# Patient Record
Sex: FEMALE
[De-identification: names, ages, dates, MRNs, and addresses within clinical notes are randomized per-mention and may not be internally consistent; named-entity substitution may affect disease eponyms.]

---

## 2020-02-24 ENCOUNTER — APPOINTMENT (OUTPATIENT)
Dept: MAMMOGRAPHY | Facility: CLINIC | Age: 50
End: 2020-02-24
Payer: COMMERCIAL

## 2020-02-24 PROCEDURE — 77063 BREAST TOMOSYNTHESIS BI: CPT

## 2020-02-24 PROCEDURE — 77067 SCR MAMMO BI INCL CAD: CPT

## 2020-03-20 ENCOUNTER — APPOINTMENT (OUTPATIENT)
Dept: ULTRASOUND IMAGING | Facility: CLINIC | Age: 50
End: 2020-03-20

## 2020-03-20 ENCOUNTER — APPOINTMENT (OUTPATIENT)
Dept: MAMMOGRAPHY | Facility: CLINIC | Age: 50
End: 2020-03-20
Payer: COMMERCIAL

## 2020-03-20 PROCEDURE — 76642 ULTRASOUND BREAST LIMITED: CPT | Mod: LT

## 2020-03-20 PROCEDURE — G0279: CPT | Mod: LT

## 2020-03-20 PROCEDURE — 77065 DX MAMMO INCL CAD UNI: CPT | Mod: LT

## 2020-03-25 PROBLEM — Z00.00 ENCOUNTER FOR PREVENTIVE HEALTH EXAMINATION: Status: ACTIVE | Noted: 2020-03-25

## 2020-06-18 ENCOUNTER — APPOINTMENT (OUTPATIENT)
Dept: BREAST CENTER | Facility: CLINIC | Age: 50
End: 2020-06-18
Payer: COMMERCIAL

## 2020-06-18 VITALS
BODY MASS INDEX: 36.1 KG/M2 | HEIGHT: 67 IN | HEART RATE: 109 BPM | WEIGHT: 230 LBS | TEMPERATURE: 98.7 F | DIASTOLIC BLOOD PRESSURE: 90 MMHG | SYSTOLIC BLOOD PRESSURE: 148 MMHG

## 2020-06-18 DIAGNOSIS — Z63.5 DISRUPTION OF FAMILY BY SEPARATION AND DIVORCE: ICD-10-CM

## 2020-06-18 DIAGNOSIS — R92.2 INCONCLUSIVE MAMMOGRAM: ICD-10-CM

## 2020-06-18 DIAGNOSIS — Z80.1 FAMILY HISTORY OF MALIGNANT NEOPLASM OF TRACHEA, BRONCHUS AND LUNG: ICD-10-CM

## 2020-06-18 DIAGNOSIS — R92.8 OTHER ABNORMAL AND INCONCLUSIVE FINDINGS ON DIAGNOSTIC IMAGING OF BREAST: ICD-10-CM

## 2020-06-18 DIAGNOSIS — Z78.9 OTHER SPECIFIED HEALTH STATUS: ICD-10-CM

## 2020-06-18 DIAGNOSIS — Z72.89 OTHER PROBLEMS RELATED TO LIFESTYLE: ICD-10-CM

## 2020-06-18 DIAGNOSIS — F19.90 OTHER PSYCHOACTIVE SUBSTANCE USE, UNSPECIFIED, UNCOMPLICATED: ICD-10-CM

## 2020-06-18 DIAGNOSIS — R93.89 ABNORMAL FINDINGS ON DIAGNOSTIC IMAGING OF OTHER SPECIFIED BODY STRUCTURES: ICD-10-CM

## 2020-06-18 PROCEDURE — 99243 OFF/OP CNSLTJ NEW/EST LOW 30: CPT

## 2020-06-18 RX ORDER — IRBESARTAN 300 MG/1
300 TABLET, FILM COATED ORAL
Refills: 0 | Status: ACTIVE | COMMUNITY

## 2020-06-18 RX ORDER — SERTRALINE HYDROCHLORIDE 100 MG/1
100 TABLET, FILM COATED ORAL
Refills: 0 | Status: ACTIVE | COMMUNITY

## 2020-06-18 RX ORDER — LEVOTHYROXINE SODIUM 137 UG/1
137 TABLET ORAL
Refills: 0 | Status: ACTIVE | COMMUNITY

## 2020-06-18 RX ORDER — CLONAZEPAM 0.5 MG/1
0.5 TABLET ORAL
Refills: 0 | Status: ACTIVE | COMMUNITY

## 2020-06-18 SDOH — SOCIAL STABILITY - SOCIAL INSECURITY: DISRUPTION OF FAMILY BY SEPARATION AND DIVORCE: Z63.5

## 2020-06-25 ENCOUNTER — APPOINTMENT (OUTPATIENT)
Dept: ULTRASOUND IMAGING | Facility: CLINIC | Age: 50
End: 2020-06-25
Payer: COMMERCIAL

## 2020-06-25 ENCOUNTER — RESULT REVIEW (OUTPATIENT)
Age: 50
End: 2020-06-25

## 2020-06-25 DIAGNOSIS — D24.2 BENIGN NEOPLASM OF LEFT BREAST: ICD-10-CM

## 2020-06-25 PROCEDURE — 76641 ULTRASOUND BREAST COMPLETE: CPT | Mod: RT

## 2020-06-25 PROCEDURE — 19083 BX BREAST 1ST LESION US IMAG: CPT | Mod: LT

## 2020-06-25 PROCEDURE — A4648A: CUSTOM | Mod: 76

## 2020-06-25 PROCEDURE — 77065 DX MAMMO INCL CAD UNI: CPT | Mod: LT

## 2020-06-25 PROCEDURE — 19084Z: CUSTOM

## 2020-07-02 NOTE — ASSESSMENT
[FreeTextEntry1] : Pt is a 49 y/o female, here for initial consultation, referred by Dr. Junior for abnormal mammogram and breast US. Denies any breast lesions, discharge or masses. This was pt's first screening mmg.\par FHx sig for mUncle lung cancer 62. Denies FHx breast of other cancers.\par \par 2/24/20 NFR Von SmmgT: baseline, L two regions of asymmetry, UOQ (8mm) ovoid density, 6:00 nodular asymmetry; R no sig findings.BR0 R Dmmg/US rec.\par \par 3/20/20 NFR L DmmgT/US: dense, *suspicious nodules to UOQ (8mm) round and 6:00 (9mm) with lobulated boarders.\par 3/20/20 NFR L D US: baseline:\par    *1:00 N1 (1.1x0.7x1.0cm) lobulated hypoechoic solid nodule, US Bx rec\par     *2:00 N1 (1.1x0.7x1.0cm) lobulated hypoechoic solid nodule w internal septations,US Bx rec\par     6:00 N1 ([id number]) ovoid circumscribed hypoechoic nodule,\par     6:00 N1 ([id number]) ovoid circumscribed hypoechoic nodule. BR4A Us Bx rec for 1:00 and 2:00 nodules.\par CBE: [facility]. None of the masses on u/s are palpable.\par Reviewed mmg/US with recommendation for US Bx X2. Reviewed procedure, pt agrees to schedule. Pt very anxious and rec is to take her klonopin prior to the bx. Also discussed further treatment f.u pending results of bx. Pt to f.u 1 week after her biopsy. \par No ASA or NSAIDs 1 week prior.

## 2020-07-02 NOTE — REVIEW OF SYSTEMS
[Negative] : Endocrine [FreeTextEntry2] : work related stress weight loss [Chest Pain] : no chest pain [FreeTextEntry7] : stress from work [de-identified] : anxiety

## 2020-07-02 NOTE — HISTORY OF PRESENT ILLNESS
[FreeTextEntry1] : Pt is a 51 y/o white female, here for initial consultation, referred by Dr. Junior for abnormal mammogram and breast US. Denies any breast lesions, discharge or masses. This was pt's first screening mmg.\par FHx sig for mUncle lung cancer 62. Denies FHx breast of other cancers.\par \par 2/24/20 NFR Von SmmgT: baseline, L two regions of asymmetry, UOQ (8mm) ovoid density, 6:00 nodular asymmetry; R no sig findings.BR0 R Dmmg/US rec.\par \par 3/20/20 NFR L DmmgT/US: dense, *suspicious nodules to UOQ (8mm) round and 6:00 (9mm) with lobulated boarders.\par 3/20/20 NFR L D US: baseline:\par    *1:00 N1 (1.1x0.7x1.0cm) lobulated hypoechoic solid nodule, US Bx rec\par     *2:00 N1 (1.1x0.7x1.0cm) lobulated hypoechoic solid nodule w internal septations,US Bx rec\par     6:00 N1 ([id number]) ovoid circumscribed hypoechoic nodule,\par     6:00 N1 ([id number]) ovoid circumscribed hypoechoic nodule. BR4A Us Bx rec for 1:00 and 2:00             nodules.

## 2020-07-02 NOTE — DATA REVIEWED
[FreeTextEntry1] : 2/24/20 NFR Von SmmgT: baseline, L two regions of asymmetry, UOQ (8mm) ovoid density, 6:00 nodular asymmetry; R no sig findings.BR0 L Dmmg/US rec.\par \par 3/20/20 NFR L DmmgT/US: dense, *suspicious nodules to UOQ (8mm) round and 6:00 (9mm) with lobulated boarders.\par 3/20/20 NFR L D US: baseline:\par    *1:00 N1 (1.1x0.7x1.0cm) lobulated hypoechoic solid nodule, US Bx rec\par     *2:00 N1 (1.1x0.7x1.0cm) lobulated hypoechoic solid nodule w internal septations,US Bx rec\par     6:00 N1 ([id number]) ovoid circumscribed hypoechoic nodule,\par     6:00 N1 ([id number]) ovoid circumscribed hypoechoic nodule. BR4A Us Bx rec for 1:00 and 2:00             nodules.

## 2020-07-02 NOTE — PAST MEDICAL HISTORY
[Total Preg ___] : G[unfilled] [Menarche Age ____] : age at menarche was [unfilled] [Menopause Age____] : age at menopause was [unfilled] [Living ___] : Living: [unfilled] [Live Births ___] : P[unfilled]  [History of Hormone Replacement Treatment] : has no history of hormone replacement treatment [FreeTextEntry8] : no [FreeTextEntry6] : no [FreeTextEntry7] : yes 8-10 years [FreeTextEntry5] : L oophorectomy

## 2020-07-02 NOTE — ADDENDUM
[FreeTextEntry1] : 6/25/20 NW R US: baseline, (10mm) benign hypoechoic nodule noted similar to multiple nodules on L. No susp findings BR3 US rec 6 mo unless L US Bx document suspicious findings.\par 6/25/20 Dustin Left  US Bx:FA. Concordant. Rec mmg/US 6 mo.

## 2020-07-02 NOTE — PHYSICAL EXAM
[Bra Size: ___] : Bra Size: [unfilled] [Normocephalic] : normocephalic [Atraumatic] : atraumatic [Supple] : supple [No Thyromegaly] : no thyromegaly [No Supraclavicular Adenopathy] : no supraclavicular adenopathy [Examined in the supine and seated position] : examined in the supine and seated position [No dominant masses] : no dominant masses in right breast  [No dominant masses] : no dominant masses left breast [No Nipple Retraction] : no left nipple retraction [No Nipple Discharge] : no left nipple discharge [No Axillary Lymphadenopathy] : no left axillary lymphadenopathy [No Edema] : no edema [No Ulceration] : no ulceration [No Rashes] : no rashes [No Swelling] : no swelling [Full ROM] : full range of motion [Breast Nipple Flattening] : nipples not flattened [Breast Nipple Retraction] : nipples not retracted [Breast Nipple Inversion] : nipples not inverted [Breast Abnormal Secretion Serous Fluid] : no serous discharge [Breast Nipple Fissures] : nipples not fissured [Breast Abnormal Secretion Bloody Fluid] : no bloody discharge [Breast Abnormal Lactation (Galactorrhea)] : no galactorrhea [Breast Abnormal Secretion Opalescent Fluid] : no milky discharge

## 2020-07-09 PROBLEM — D24.2 FIBROADENOMA OF LEFT BREAST: Status: ACTIVE | Noted: 2020-07-09

## 2021-11-29 ENCOUNTER — APPOINTMENT (OUTPATIENT)
Dept: ULTRASOUND IMAGING | Facility: CLINIC | Age: 51
End: 2021-11-29
Payer: COMMERCIAL

## 2021-11-29 ENCOUNTER — APPOINTMENT (OUTPATIENT)
Dept: MAMMOGRAPHY | Facility: CLINIC | Age: 51
End: 2021-11-29

## 2021-11-29 PROCEDURE — 77066 DX MAMMO INCL CAD BI: CPT

## 2021-11-29 PROCEDURE — 76642 ULTRASOUND BREAST LIMITED: CPT | Mod: LT

## 2021-11-29 PROCEDURE — G0279: CPT

## 2023-02-24 ENCOUNTER — APPOINTMENT (OUTPATIENT)
Dept: MAMMOGRAPHY | Facility: CLINIC | Age: 53
End: 2023-02-24
Payer: COMMERCIAL

## 2023-02-24 ENCOUNTER — APPOINTMENT (OUTPATIENT)
Dept: ULTRASOUND IMAGING | Facility: CLINIC | Age: 53
End: 2023-02-24
Payer: COMMERCIAL

## 2023-02-24 PROCEDURE — 76641 ULTRASOUND BREAST COMPLETE: CPT | Mod: 50

## 2023-02-24 PROCEDURE — 77067 SCR MAMMO BI INCL CAD: CPT

## 2023-02-24 PROCEDURE — 77063 BREAST TOMOSYNTHESIS BI: CPT

## 2023-10-26 VITALS — WEIGHT: 230 LBS | HEIGHT: 67 IN | BODY MASS INDEX: 36.1 KG/M2

## 2023-10-26 DIAGNOSIS — F17.200 NICOTINE DEPENDENCE, UNSPECIFIED, UNCOMPLICATED: ICD-10-CM

## 2023-11-07 ENCOUNTER — APPOINTMENT (OUTPATIENT)
Dept: CT IMAGING | Facility: CLINIC | Age: 53
End: 2023-11-07
Payer: COMMERCIAL

## 2023-11-07 PROCEDURE — 71271 CT THORAX LUNG CANCER SCR C-: CPT

## 2024-03-28 ENCOUNTER — APPOINTMENT (OUTPATIENT)
Dept: ULTRASOUND IMAGING | Facility: CLINIC | Age: 54
End: 2024-03-28
Payer: COMMERCIAL

## 2024-03-28 ENCOUNTER — APPOINTMENT (OUTPATIENT)
Dept: MAMMOGRAPHY | Facility: CLINIC | Age: 54
End: 2024-03-28
Payer: COMMERCIAL

## 2024-03-28 PROCEDURE — 76641 ULTRASOUND BREAST COMPLETE: CPT | Mod: 50

## 2024-03-28 PROCEDURE — 77067 SCR MAMMO BI INCL CAD: CPT

## 2024-03-28 PROCEDURE — 77063 BREAST TOMOSYNTHESIS BI: CPT

## 2024-11-07 ENCOUNTER — NON-APPOINTMENT (OUTPATIENT)
Age: 54
End: 2024-11-07

## 2024-11-07 VITALS — WEIGHT: 230 LBS | HEIGHT: 67 IN | BODY MASS INDEX: 36.1 KG/M2

## 2024-11-07 DIAGNOSIS — F17.200 NICOTINE DEPENDENCE, UNSPECIFIED, UNCOMPLICATED: ICD-10-CM

## 2024-11-27 ENCOUNTER — APPOINTMENT (OUTPATIENT)
Dept: CT IMAGING | Facility: CLINIC | Age: 54
End: 2024-11-27
Payer: COMMERCIAL

## 2024-11-27 ENCOUNTER — APPOINTMENT (OUTPATIENT)
Dept: ULTRASOUND IMAGING | Facility: CLINIC | Age: 54
End: 2024-11-27
Payer: COMMERCIAL

## 2024-11-27 PROCEDURE — 76642 ULTRASOUND BREAST LIMITED: CPT | Mod: RT

## 2024-11-27 PROCEDURE — 71271 CT THORAX LUNG CANCER SCR C-: CPT

## 2024-12-25 PROBLEM — F10.90 ALCOHOL USE: Status: ACTIVE | Noted: 2020-06-18

## 2025-07-30 ENCOUNTER — APPOINTMENT (OUTPATIENT)
Dept: ULTRASOUND IMAGING | Facility: CLINIC | Age: 55
End: 2025-07-30
Payer: COMMERCIAL

## 2025-07-30 ENCOUNTER — APPOINTMENT (OUTPATIENT)
Dept: MAMMOGRAPHY | Facility: CLINIC | Age: 55
End: 2025-07-30
Payer: COMMERCIAL

## 2025-07-30 PROCEDURE — 76641 ULTRASOUND BREAST COMPLETE: CPT | Mod: 50

## 2025-07-30 PROCEDURE — G0279: CPT

## 2025-07-30 PROCEDURE — 77066 DX MAMMO INCL CAD BI: CPT
